# Patient Record
Sex: MALE | Race: ASIAN
[De-identification: names, ages, dates, MRNs, and addresses within clinical notes are randomized per-mention and may not be internally consistent; named-entity substitution may affect disease eponyms.]

---

## 2020-09-11 ENCOUNTER — HOSPITAL ENCOUNTER (EMERGENCY)
Dept: HOSPITAL 4 - SED | Age: 20
Discharge: HOME | End: 2020-09-11
Payer: MEDICAID

## 2020-09-11 VITALS — SYSTOLIC BLOOD PRESSURE: 109 MMHG

## 2020-09-11 VITALS — HEIGHT: 70 IN | WEIGHT: 135 LBS | BODY MASS INDEX: 19.33 KG/M2

## 2020-09-11 DIAGNOSIS — H01.002: Primary | ICD-10-CM

## 2020-11-10 ENCOUNTER — HOSPITAL ENCOUNTER (EMERGENCY)
Dept: HOSPITAL 4 - SED | Age: 20
Discharge: HOME | End: 2020-11-10
Payer: MEDICAID

## 2020-11-10 VITALS — HEIGHT: 70 IN | BODY MASS INDEX: 19.76 KG/M2 | WEIGHT: 138 LBS

## 2020-11-10 VITALS — SYSTOLIC BLOOD PRESSURE: 111 MMHG

## 2020-11-10 DIAGNOSIS — L03.213: Primary | ICD-10-CM

## 2020-11-10 NOTE — NUR
Patient presented to ER C/O EYE IRRITATION Patient ambulatory to ER A&OX4, 
afebrile, skin pink & warm, denies N/V/D, denies painright eye periorbital 
redness to right eye. Patient states he has irritation and redness x2 days to 
right eye. PT ststes he has recent hx pink eye.

## 2020-11-10 NOTE — NUR
Patient given written and verbal discharge instructions and verbalizes 
understanding.  ER MD discussed with patient the results and treatment 
provided. Patient in stable condition. ID arm band removed.

Rx of keflex,polytrim given. Patient educated on pain management and to follow 
up with PMD. Pain Scale 0/10.

Opportunity for questions provided and answered. Medication side effect fact 
sheet provided.

## 2021-02-05 ENCOUNTER — HOSPITAL ENCOUNTER (EMERGENCY)
Dept: HOSPITAL 4 - SED | Age: 21
Discharge: HOME | End: 2021-02-05
Payer: MEDICAID

## 2021-02-05 VITALS — WEIGHT: 140 LBS | BODY MASS INDEX: 20.04 KG/M2 | HEIGHT: 70 IN

## 2021-02-05 VITALS — SYSTOLIC BLOOD PRESSURE: 109 MMHG

## 2021-02-05 DIAGNOSIS — H11.432: Primary | ICD-10-CM

## 2021-02-05 DIAGNOSIS — H57.89: ICD-10-CM

## 2021-06-21 ENCOUNTER — HOSPITAL ENCOUNTER (EMERGENCY)
Dept: HOSPITAL 4 - SED | Age: 21
LOS: 1 days | Discharge: HOME | End: 2021-06-22
Payer: MEDICAID

## 2021-06-21 VITALS — BODY MASS INDEX: 19.33 KG/M2 | WEIGHT: 135 LBS | HEIGHT: 70 IN

## 2021-06-21 VITALS — SYSTOLIC BLOOD PRESSURE: 133 MMHG

## 2021-06-21 DIAGNOSIS — H10.9: Primary | ICD-10-CM

## 2021-06-21 DIAGNOSIS — Z79.899: ICD-10-CM

## 2021-06-22 VITALS — SYSTOLIC BLOOD PRESSURE: 133 MMHG

## 2021-06-22 NOTE — NUR
Pt BIB family to ED C/O reddness / irritation in R eye after taking nap today 
and woke up with red crust right eye. VSS no s/s of acute distress No other 
complaints noted Resting on gurney rails up

## 2021-06-22 NOTE — NUR
Patient given written and verbal discharge instructions and verbalizes 
understanding.  ER MD discussed with patient the results and treatment 
provided. Patient in stable condition. ID arm band removed. Rx of Tobrex given. 
Patient educated on pain management and to follow up with PMD. Pain Scale 0/10 
Opportunity for questions provided and answered. Medication side effect fact 
sheet provided.

## 2021-07-08 ENCOUNTER — HOSPITAL ENCOUNTER (EMERGENCY)
Dept: HOSPITAL 4 - SED | Age: 21
Discharge: HOME | End: 2021-07-08
Payer: MEDICAID

## 2021-07-08 VITALS — SYSTOLIC BLOOD PRESSURE: 115 MMHG

## 2021-07-08 VITALS — BODY MASS INDEX: 19.33 KG/M2 | WEIGHT: 135 LBS | HEIGHT: 70 IN

## 2021-07-08 DIAGNOSIS — R05: Primary | ICD-10-CM

## 2021-07-08 DIAGNOSIS — Z79.899: ICD-10-CM

## 2021-07-14 ENCOUNTER — HOSPITAL ENCOUNTER (EMERGENCY)
Dept: HOSPITAL 4 - SED | Age: 21
Discharge: LEFT BEFORE DISCHARGE | End: 2021-07-14
Payer: MEDICAID

## 2021-07-14 VITALS — SYSTOLIC BLOOD PRESSURE: 110 MMHG

## 2021-07-14 VITALS — BODY MASS INDEX: 18.61 KG/M2 | WEIGHT: 130 LBS | HEIGHT: 70 IN

## 2021-07-14 DIAGNOSIS — Z79.899: ICD-10-CM

## 2021-07-14 DIAGNOSIS — J06.9: Primary | ICD-10-CM

## 2021-07-14 DIAGNOSIS — Z20.822: ICD-10-CM

## 2021-07-15 ENCOUNTER — HOSPITAL ENCOUNTER (EMERGENCY)
Dept: HOSPITAL 4 - SED | Age: 21
Discharge: HOME | End: 2021-07-15
Payer: MEDICAID

## 2021-07-15 VITALS — SYSTOLIC BLOOD PRESSURE: 132 MMHG

## 2021-07-15 VITALS — WEIGHT: 130 LBS | BODY MASS INDEX: 18.61 KG/M2 | HEIGHT: 70 IN

## 2021-07-15 DIAGNOSIS — R09.82: Primary | ICD-10-CM

## 2021-07-15 DIAGNOSIS — Z79.899: ICD-10-CM

## 2021-07-15 DIAGNOSIS — R05: ICD-10-CM

## 2021-09-23 ENCOUNTER — HOSPITAL ENCOUNTER (EMERGENCY)
Dept: HOSPITAL 4 - SED | Age: 21
Discharge: HOME | End: 2021-09-23
Payer: SELF-PAY

## 2021-09-23 VITALS — WEIGHT: 140 LBS | HEIGHT: 70 IN | BODY MASS INDEX: 20.04 KG/M2

## 2021-09-23 VITALS — SYSTOLIC BLOOD PRESSURE: 122 MMHG

## 2021-09-23 DIAGNOSIS — H00.012: Primary | ICD-10-CM

## 2021-09-23 DIAGNOSIS — Z79.899: ICD-10-CM

## 2021-09-23 NOTE — NUR
Pt walked in to ER with c/o right eye swelling, redness and itching x1 day, no 
discharge noted. Pt denies pain or fever at this time. V/S stable, no acute 
distress noted.

## 2021-09-24 ENCOUNTER — HOSPITAL ENCOUNTER (EMERGENCY)
Dept: HOSPITAL 4 - SED | Age: 21
Discharge: HOME | End: 2021-09-24
Payer: MEDICAID

## 2021-09-24 VITALS — HEIGHT: 70 IN | BODY MASS INDEX: 18.61 KG/M2 | WEIGHT: 130 LBS

## 2021-09-24 VITALS — SYSTOLIC BLOOD PRESSURE: 108 MMHG

## 2021-09-24 DIAGNOSIS — Z79.899: ICD-10-CM

## 2021-09-24 DIAGNOSIS — H10.9: Primary | ICD-10-CM

## 2021-09-24 NOTE — NUR
Patient given written and verbal discharge instructions and verbalizes 
understanding.  ER MD discussed with patient the results and treatment 
provided. Patient in stable condition. ID arm band removed. Rx of Vigamox 
given. Patient educated on pain management and to follow up with PMD. Pain 
Scale 0/10 Opportunity for questions provided and answered. Medication side 
effect fact sheet provided.

## 2021-09-24 NOTE — NUR
Pt BIB family to ED seeking evaluation of 2 days of right eye erythema and one 
day of associated eye blurriness. The patient was seen here yesterday and was 
discharged home with a prescription for Keflex. The patient reports that this 
morning he woke up with his eye more erythematous and states his eye now is 
having yellow discharge. The patient requests that he get a prescription or 
antibiotic eye drops. The patient wears contact lenses, but reports he has not 
been wearing them for 3 days